# Patient Record
Sex: FEMALE | Race: WHITE | NOT HISPANIC OR LATINO
[De-identification: names, ages, dates, MRNs, and addresses within clinical notes are randomized per-mention and may not be internally consistent; named-entity substitution may affect disease eponyms.]

---

## 2020-06-18 PROBLEM — Z00.00 ENCOUNTER FOR PREVENTIVE HEALTH EXAMINATION: Status: ACTIVE | Noted: 2020-06-18

## 2020-06-22 ENCOUNTER — APPOINTMENT (OUTPATIENT)
Dept: NEPHROLOGY | Facility: CLINIC | Age: 40
End: 2020-06-22
Payer: SELF-PAY

## 2020-06-22 VITALS
WEIGHT: 126 LBS | HEART RATE: 72 BPM | BODY MASS INDEX: 20.25 KG/M2 | HEIGHT: 66 IN | SYSTOLIC BLOOD PRESSURE: 116 MMHG | DIASTOLIC BLOOD PRESSURE: 77 MMHG

## 2020-06-22 DIAGNOSIS — E87.1 HYPO-OSMOLALITY AND HYPONATREMIA: ICD-10-CM

## 2020-06-22 DIAGNOSIS — E83.42 HYPOMAGNESEMIA: ICD-10-CM

## 2020-06-22 PROCEDURE — 99204 OFFICE O/P NEW MOD 45 MIN: CPT

## 2020-06-22 NOTE — HISTORY OF PRESENT ILLNESS
[FreeTextEntry1] : 40-year-old woman with a history of chronic hyponatremia dating back at least to October 2018 when her sodium was 135 on routine labs.  She developed more severe hyponatremia February 29 of 2020, when her sodium was 122 in the ER and she was treated with 2 L of normal saline, which brought her up to 131.  Her renal function is normal with a creatinine of 0.55 - 0.6, and a GFR of about 120.  Her magnesium has been 1.5-1.6, but there is no history of chronic diarrhea.  She became acutely ill again on June 14 and visited the ER in [facility], where her sodium was 131 with a normal urinalysis.  She had complained of upper and mid back pain for several days, and brief diarrhea.  She is very active with physical exercise, running 6 to 7 miles, often in hot weather.  She drinks a large amount of fluid, which was largely water until February.  Since then she drinks largely Pedialyte or Gatorade and virtually no free water.  Her weight is stable at 125 -130 pounds.  Interestingly her father's sister tells her that she has chronic hyponatremia with a sodium set at about 130.  There is no history of hyperlipidemia or hyperglycemia to explain her sodium.

## 2020-06-22 NOTE — CONSULT LETTER
[Dear  ___] : Dear  [unfilled], [Courtesy Letter:] : I had the pleasure of seeing your patient, [unfilled], in my office today. [Please see my note below.] : Please see my note below. [Sincerely,] : Sincerely, [FreeTextEntry2] : Dr Hafsa jones [Consult Closing:] : Thank you very much for allowing me to participate in the care of this patient.  If you have any questions, please do not hesitate to contact me. [FreeTextEntry3] : Sincerely, \par \par Buzz Chavira MD, FACP

## 2020-06-22 NOTE — ASSESSMENT
[FreeTextEntry1] : 40-year-old woman with no significant past history or chronic medication usage, who has developed hyponatremia in the last 2 years, with no obvious etiology.  Her sodium dropped to 122 in February and then came up to 133 in March.  She went to the ER on June 14 with back pain and brief diarrhea and was found to have a sodium of 131.  Her thyroid status is unknown.  We will screen for SIADH.  Labs have been ordered, to include random urine osmolality and sodium, TSH, uric acid, hgb A1C,repeat electrolytes.  One remote possibility would be surreptitious use of thiazide like diuretics, but there is no direct evidence for that.  I have asked her to buy Urena, and take 1 packet daily for 2 weeks and repeat electrolytes.  There is no evidence of pseudohyponatremia caused by entities like hyperglycemia, hyperlipidemia, or hyperproteinemia.

## 2020-06-22 NOTE — PHYSICAL EXAM
[General Appearance - In No Acute Distress] : in no acute distress [General Appearance - Alert] : alert [PERRL With Normal Accommodation] : pupils were equal in size, round, and reactive to light [Sclera] : the sclera and conjunctiva were normal [Outer Ear] : the ears and nose were normal in appearance [Neck Cervical Mass (___cm)] : no neck mass was observed [Jugular Venous Distention Increased] : there was no jugular-venous distention [Neck Appearance] : the appearance of the neck was normal [Thyroid Nodule] : there were no palpable thyroid nodules [Thyroid Diffuse Enlargement] : the thyroid was not enlarged [Auscultation Breath Sounds / Voice Sounds] : lungs were clear to auscultation bilaterally [Heart Sounds] : normal S1 and S2 [Heart Rate And Rhythm] : heart rate was normal and rhythm regular [Heart Sounds Gallop] : no gallops [Heart Sounds Pericardial Friction Rub] : no pericardial rub [Murmurs] : no murmurs [Edema] : there was no peripheral edema [Cervical Lymph Nodes Enlarged Anterior Bilaterally] : anterior cervical [Cervical Lymph Nodes Enlarged Posterior Bilaterally] : posterior cervical [Supraclavicular Lymph Nodes Enlarged Bilaterally] : supraclavicular [No CVA Tenderness] : no ~M costovertebral angle tenderness [Musculoskeletal - Swelling] : no joint swelling seen [Nail Clubbing] : no clubbing  or cyanosis of the fingernails [Abnormal Walk] : normal gait [Skin Turgor] : normal skin turgor [] : no rash [Skin Color & Pigmentation] : normal skin color and pigmentation [Motor Tone] : muscle strength and tone were normal [Sensation] : the sensory exam was normal to light touch and pinprick [Deep Tendon Reflexes (DTR)] : deep tendon reflexes were 2+ and symmetric [No Focal Deficits] : no focal deficits [Oriented To Time, Place, And Person] : oriented to person, place, and time [Impaired Insight] : insight and judgment were intact [Affect] : the affect was normal

## 2020-06-23 LAB
ANION GAP SERPL CALC-SCNC: 11 MMOL/L
BUN SERPL-MCNC: 12 MG/DL
CALCIUM SERPL-MCNC: 9 MG/DL
CHLORIDE SERPL-SCNC: 108 MMOL/L
CO2 SERPL-SCNC: 25 MMOL/L
CREAT SERPL-MCNC: 0.59 MG/DL
ESTIMATED AVERAGE GLUCOSE: 108 MG/DL
GLUCOSE SERPL-MCNC: 108 MG/DL
HBA1C MFR BLD HPLC: 5.4 %
OSMOLALITY UR: 884 MOSM/KG
POTASSIUM SERPL-SCNC: 4 MMOL/L
SODIUM ?TM SUB UR QN: 174 MMOL/L
SODIUM SERPL-SCNC: 144 MMOL/L
TSH SERPL-ACNC: 1.33 UIU/ML
URATE SERPL-MCNC: 3.5 MG/DL